# Patient Record
Sex: FEMALE | Race: WHITE | NOT HISPANIC OR LATINO | Employment: FULL TIME | URBAN - METROPOLITAN AREA
[De-identification: names, ages, dates, MRNs, and addresses within clinical notes are randomized per-mention and may not be internally consistent; named-entity substitution may affect disease eponyms.]

---

## 2024-10-22 NOTE — PROGRESS NOTES
"Thuy Martinez is a 34 y.o. here for abnormal menses  HPI: Things are better than when she made this appointment.  She has 6-7 months of really bad periods. Had to take off work. One day she threw up.  The last 3 periods were Ok.  Is it PCOS? Is it endo.    A few reasons she does not want to get on birth control. She took it and felt different for her. Decrease in sex drive. Worries about side effects.  Has a period every month. Last 5-6 days. Varies within 5 days.very heavy. Pain and vomiting on the heavy days.    Once came 1 week after the last one.  Has PMDD.  No plans for children.  had vasectomy.  Has meoprolol for tachycardia and losartan for htn.  No exercise  Saw a dietician last year but gained some weight back  GynHx: dysmenorrhea, PMDD  Pap Hx: This year at her PCP  Social History     Substance and Sexual Activity   Sexual Activity Yes    Partners: Male    Birth control/protection: Male Sterilization     Current contraception: Vasectomy  STIs: none  Past med hx and past surg hx reviewed and notable for: htn, PMDD, depression, obesity    Objective   /78   Ht 1.715 m (5' 7.5\")   Wt 120 kg (265 lb)   LMP 10/18/2024   BMI 40.89 kg/m²   Physical Exam  Vitals reviewed.   Constitutional:       Appearance: Normal appearance. She is obese.   Neurological:      General: No focal deficit present.      Mental Status: She is alert.   Psychiatric:         Mood and Affect: Mood normal.         Behavior: Behavior normal.          Assessment and Plan:  Problem List Items Addressed This Visit          Ob-Gyn Problems    Dysmenorrhea - Primary    Relevant Medications    norethindrone (Aygestin) 5 mg tablet     Other Visit Diagnoses       Class 3 severe obesity without serious comorbidity with body mass index (BMI) of 40.0 to 44.9 in adult, unspecified obesity type        Relevant Orders    Referral to Endocrinology           Orders Placed This Encounter   Procedures    Referral to Endocrinology     Standing " Status:   Future     Standing Expiration Date:   10/24/2025     Referral Priority:   Routine     Referral Type:   Consultation     Referral Reason:   Specialty Services Required     Referred to Provider:   Fer Restrepo MD     Requested Specialty:   Endocrinology     Number of Visits Requested:   1

## 2024-10-24 ENCOUNTER — APPOINTMENT (OUTPATIENT)
Dept: OBSTETRICS AND GYNECOLOGY | Facility: CLINIC | Age: 34
End: 2024-10-24
Payer: COMMERCIAL

## 2024-10-24 VITALS
SYSTOLIC BLOOD PRESSURE: 122 MMHG | HEIGHT: 68 IN | BODY MASS INDEX: 40.16 KG/M2 | WEIGHT: 265 LBS | DIASTOLIC BLOOD PRESSURE: 78 MMHG

## 2024-10-24 DIAGNOSIS — N94.6 DYSMENORRHEA: Primary | ICD-10-CM

## 2024-10-24 DIAGNOSIS — E66.01 CLASS 3 SEVERE OBESITY WITHOUT SERIOUS COMORBIDITY WITH BODY MASS INDEX (BMI) OF 40.0 TO 44.9 IN ADULT, UNSPECIFIED OBESITY TYPE: ICD-10-CM

## 2024-10-24 DIAGNOSIS — E66.813 CLASS 3 SEVERE OBESITY WITHOUT SERIOUS COMORBIDITY WITH BODY MASS INDEX (BMI) OF 40.0 TO 44.9 IN ADULT, UNSPECIFIED OBESITY TYPE: ICD-10-CM

## 2024-10-24 PROCEDURE — 99203 OFFICE O/P NEW LOW 30 MIN: CPT | Performed by: OBSTETRICS & GYNECOLOGY

## 2024-10-24 PROCEDURE — 3078F DIAST BP <80 MM HG: CPT | Performed by: OBSTETRICS & GYNECOLOGY

## 2024-10-24 PROCEDURE — 3008F BODY MASS INDEX DOCD: CPT | Performed by: OBSTETRICS & GYNECOLOGY

## 2024-10-24 PROCEDURE — 3074F SYST BP LT 130 MM HG: CPT | Performed by: OBSTETRICS & GYNECOLOGY

## 2024-10-24 PROCEDURE — 1036F TOBACCO NON-USER: CPT | Performed by: OBSTETRICS & GYNECOLOGY

## 2024-10-24 RX ORDER — LOSARTAN POTASSIUM 50 MG/1
50 TABLET ORAL
COMMUNITY
Start: 2024-07-01

## 2024-10-24 RX ORDER — LOSARTAN POTASSIUM 25 MG/1
50 TABLET ORAL DAILY
COMMUNITY
End: 2024-10-24 | Stop reason: ALTCHOICE

## 2024-10-24 RX ORDER — METOPROLOL SUCCINATE 25 MG/1
1 TABLET, EXTENDED RELEASE ORAL
COMMUNITY
Start: 2024-10-14

## 2024-10-24 RX ORDER — SERTRALINE HYDROCHLORIDE 50 MG/1
1 TABLET, FILM COATED ORAL
COMMUNITY
Start: 2024-10-14

## 2024-10-24 RX ORDER — NORETHINDRONE 5 MG/1
2.5 TABLET ORAL DAILY
Qty: 15 TABLET | Refills: 11 | Status: SHIPPED | OUTPATIENT
Start: 2024-10-24 | End: 2025-10-24

## 2024-10-24 ASSESSMENT — PAIN SCALES - GENERAL: PAINLEVEL_OUTOF10: 0-NO PAIN

## 2024-10-25 PROBLEM — E88.810 INSULIN RESISTANCE SYNDROME: Status: ACTIVE | Noted: 2024-10-25

## 2024-10-25 PROBLEM — I10 PRIMARY HYPERTENSION: Status: ACTIVE | Noted: 2023-02-23

## 2024-10-25 PROBLEM — E66.813 OBESITY, CLASS III, BMI 40-49.9 (MORBID OBESITY): Status: ACTIVE | Noted: 2023-02-23

## 2024-10-25 PROBLEM — N94.6 DYSMENORRHEA: Status: ACTIVE | Noted: 2022-04-01

## 2024-10-25 PROBLEM — F41.9 ANXIETY: Status: ACTIVE | Noted: 2023-02-23

## 2024-10-25 PROBLEM — F32.81 PMDD (PREMENSTRUAL DYSPHORIC DISORDER): Status: ACTIVE | Noted: 2022-04-01

## 2024-10-25 PROBLEM — F33.1 MODERATE EPISODE OF RECURRENT MAJOR DEPRESSIVE DISORDER: Status: ACTIVE | Noted: 2024-04-18

## 2024-10-25 PROBLEM — E66.01 OBESITY, CLASS III, BMI 40-49.9 (MORBID OBESITY) (MULTI): Status: ACTIVE | Noted: 2023-02-23

## 2024-11-05 ENCOUNTER — APPOINTMENT (OUTPATIENT)
Dept: ENDOCRINOLOGY | Facility: CLINIC | Age: 34
End: 2024-11-05
Payer: COMMERCIAL

## 2024-11-05 VITALS — WEIGHT: 263 LBS | HEIGHT: 66 IN | BODY MASS INDEX: 42.27 KG/M2

## 2024-11-05 DIAGNOSIS — E66.01 CLASS 3 SEVERE OBESITY WITHOUT SERIOUS COMORBIDITY WITH BODY MASS INDEX (BMI) OF 40.0 TO 44.9 IN ADULT, UNSPECIFIED OBESITY TYPE: ICD-10-CM

## 2024-11-05 DIAGNOSIS — E66.813 CLASS 3 SEVERE OBESITY WITHOUT SERIOUS COMORBIDITY WITH BODY MASS INDEX (BMI) OF 40.0 TO 44.9 IN ADULT, UNSPECIFIED OBESITY TYPE: ICD-10-CM

## 2024-11-05 PROCEDURE — 1036F TOBACCO NON-USER: CPT | Performed by: NURSE PRACTITIONER

## 2024-11-05 PROCEDURE — 3008F BODY MASS INDEX DOCD: CPT | Performed by: NURSE PRACTITIONER

## 2024-11-05 PROCEDURE — 99204 OFFICE O/P NEW MOD 45 MIN: CPT | Performed by: NURSE PRACTITIONER

## 2024-11-05 RX ORDER — SEMAGLUTIDE 0.25 MG/.5ML
0.25 INJECTION, SOLUTION SUBCUTANEOUS
Qty: 2 ML | Refills: 1 | Status: SHIPPED | OUTPATIENT
Start: 2024-11-05 | End: 2024-11-05 | Stop reason: WASHOUT

## 2024-11-05 ASSESSMENT — ENCOUNTER SYMPTOMS
NAUSEA: 0
DYSPHORIC MOOD: 0
SHORTNESS OF BREATH: 0
WHEEZING: 0
ARTHRALGIAS: 0
POLYDIPSIA: 0
NUMBNESS: 0
FREQUENCY: 0
NERVOUS/ANXIOUS: 0
POLYPHAGIA: 0
CONSTIPATION: 0
PALPITATIONS: 0
FATIGUE: 0

## 2024-11-05 NOTE — PROGRESS NOTES
"This is a virtual visit where physical exam is limited and ROS and hx is obtained.    Thuy Martinez presents for weight loss management and obesity consult today. Referred by Dr. Dang her Gyn.  She has heavy menses thought to be related to endometriosis treated with OCP.    Her menses are monthly.  No heavy menses  She does chin hairs    Her PMH does note insulin resistance, HTN,  and obesity class 3    Obesity History:  Childhood or teen obesity history: yes  Age of Onset:  \"when I was a child, in my first weight Weight Watcher as a child\" \"I got my first period at 11 and breasts and hips and looked like a woman as a child\" \" her mom and sister were thing she says, this and being in WW at such a young age created body shame  \"in looking back I think it is really messed up that she put more emphasis on weight loss than loving my body despite losing the weight'  Lowest adult weight:   Highest adult weight:   Current weight: 263     Family history of obesity: no    Biggest challenge with weight management:     History of Weight Loss Efforts: yes  Successful weight loss techniques attempted: nutritionist consultation and Weight Watchers  Unsuccessful weight loss techniques attempted:  nothing mentioned    Current typical daily diet:  Overall we eat pretty well, eating well website or skinny taste recipes pretty much all the time maybe a pizza every two weeks\"  \"Meal prepping and portion sizes we do a lot\"  Typical Breakfast: protein shake  Typical Lunch: salad  Typical Dinner: meal prep  Soda/latte weekly intake: no, fresca at tonight, crystal light energy packets  Take out/carry out/fast food weekly: 1 x week  Portion sizes/seconds with meals: small to medium    Snacking  Daytime snacks: no  Evening snacks: sometimes, here and there 100 calorie popsicles      Describe Appetite (always hungry/no hunger/average):  Are you full after a meal?  no, \"when I was on WW I never really felt full\"  Emotional eater? Yes   Boredom " "eater? Yes     Current Exercise Habits  none, \"this is a real issue with me I do not like the feeling of being hot and sweaty and I hate that feeling and know I need to exercise but I don't because I hate the feeling\"    Sleep patterns (insomnia, waking in night) /hours of sleep per night: 11-7 AM  H/O Sleep apnea: yes  Well rested? Yes     Increased Stressors (describe daily stress): no      Any intake of an appetite suppressant or an anti-obesity medicine? No     H/O Pancreatitis: no  H/O Thyroid Medullary Cancer: no    No past medical history on file.    Current Outpatient Medications   Medication Sig Dispense Refill    losartan (Cozaar) 50 mg tablet Take 1 tablet (50 mg) by mouth once daily.      metoprolol succinate XL (Toprol-XL) 25 mg 24 hr tablet Take 1 tablet (25 mg) by mouth early in the morning..      norethindrone (Aygestin) 5 mg tablet Take 0.5 tablets (2.5 mg) by mouth once daily. 15 tablet 11    sertraline (Zoloft) 50 mg tablet Take 1 tablet (50 mg) by mouth early in the morning..       No current facility-administered medications for this visit.     frank abnormal data LIPID PANEL BASIC  Order: [id number]  Component  Ref Range & Units 6 mo ago   Cholesterol, Total  <200 mg/dL 179   Comment: <200 mg/dL, Desirable   200-239 mg/dL, Borderline high  >239 mg/dL, High   Triglyceride  <150 mg/dL 135   Comment: <150 mg/dL, Normal   150-199 mg/dL, Borderline high   200-499 mg/dL, High  >499 mg/dL, Very high   HDL Cholesterol  >39 mg/dL 39 Low    Comment:  40-59 mg/dL, Acceptable  >59 mg/dL, High: Negative risk factor for coronary heart disease  <40 mg/dL, Low: Positive risk factor for coronary heart disease   Non HDL Cholesterol  <130 mg/dL 140 High    Comment: <130 mg/dL, Optimal   130-159 mg/dL, Near optimal/above optimal   160-189 mg/dL, Borderline high   190-219 mg/dL, High  >219 mg/dL, Very high  Secondary prevention optimal non HDL Cholesterol levels are recommended to be <100 mg/dL   Fasting Time  hrs " "12   VLDL Cholesterol  <30 mg/dL 27   TC:HDL Ratio  <5.10 4.59   LDL Cholesterol  <100 mg/dL 113 High    Comment: <100 mg/dL, Optimal   100-129 mg/dL, Near optimal/above optimal   130-159 mg/dL, Borderline high   160-189 mg/dL, High  >189 mg/dL, Very high  Secondary prevention optimal LDL Cholesterol levels are recommended to be < 70 mg/dL   LDL:HDL Ratio  <2.54 2.90 High    Comment: Reference:  1. National Cholesterol Education Program ATP III Guideline At-A-Glance Quick Desk Reference: National Heart, Lung, and Blood [place]. National Institutes of Health. 2001: NIH Publication No. .  2. An International Atherosclerosis Society position paper: global recommendations for the management of dyslipidemia: executive summary, Atherosclerosis. 2014: 232(2):410-413.         Review of Systems  Review of Systems   Constitutional:  Negative for fatigue.   Respiratory:  Negative for shortness of breath and wheezing.    Cardiovascular:  Negative for chest pain and palpitations.   Gastrointestinal:  Negative for constipation and nausea.   Endocrine: Negative for polydipsia, polyphagia and polyuria.   Genitourinary:  Negative for frequency and urgency.   Musculoskeletal:  Negative for arthralgias.   Skin:  Negative for rash.   Neurological:  Negative for numbness.   Psychiatric/Behavioral:  Negative for dysphoric mood. The patient is not nervous/anxious.            Objective   LMP 10/18/2024     Physical Exam  Physical Exam  Neurological:      Mental Status: She is alert and oriented to person, place, and time.   Psychiatric:         Mood and Affect: Mood normal.         Behavior: Behavior normal.         Thought Content: Thought content normal.         Judgment: Judgment normal.         Lab Review  No results found for: \"HGBA1C\"  No results found for: \"LDLCALC\"    Weight Trends  Trends of weight reviewed in visit, see graph below:  Wt Readings from Last 3 Encounters:   10/24/24 120 kg (265 lb)   11/02/22 119 kg (262 " lb)   10/24/22 119 kg (262 lb 7 oz)   (  Assessment/Plan     Follow up and Goals:  Nutrition: Consult with Leanna. My Net Diary for logging a couple week before the visit. Continue with meal prep and protein shake  Physical Exercise: Continue to look into the rowing matching and a floor fan to place near it to address overheating with the workouts. Resistance and strength exercises 3 times per week are beneficial to build muscle, bone health, increase resting metabolic weight, decreasing muscle wasting, balance,   Medications: Call your insurance to see if they cover obesity medicines (Wegovy, Zepboud, Contrave, Qsymia, phentermine). See below on medication information for information only if medications desired in future      Problem List Items Addressed This Visit    None  Visit Diagnoses       Class 3 severe obesity without serious comorbidity with body mass index (BMI) of 40.0 to 44.9 in adult, unspecified obesity type                Diet interventions: referral to dietitian for guidance in these changes  Discussed Mediterranean Diet, given pamphlet or it will be mailed, we looked at ADA plate, portion sizes, recipes. Advised to review in preparation for nutrition consult    Handouts given:  no     Exercise intervention:   We discussed the importance of incorporating resistance and free weight  lifting into physical activity routine to prevent muscle wasting with weight loss, enhance bone health, the positive role increased muscle has in burning fat at rest. We looked at examples of these types of exercise routines online. Advised to do modifications. Advised to check with PCP if there is a h/o musculoskeletal injury or hx. We discussed benefits of walking with weight loss and as a cardio form of exercise. Gradually increase exercise to a goal of 150 minutes at least per week.      Follow Up:  Referred to nutrition or continue to work with current dietitian   Discussed obesity medications, side effects and  mechanism of action reviewed with patient  Discussed physical activity: we reviewed Youtube videos for strength training, advised to use modifications for these videos, we discussed benefits of strength training and resistance bands  on bone and muscle health, we discussed walking and water aerobic options for cardio  Discussed Mediterranean Diet, given pamphlet or it will be mailed, we looked at ADA plate, portion sizes, recipes. Advised to review Mediterranean Meal Plan booklet  in preparation for nutrition consult  ....  1 month group visit and nutrition visit in person or  virtual  Please reach out if you need anything or have further questions

## 2024-11-05 NOTE — PATIENT INSTRUCTIONS
Nutrition: Consult with Leanna. My Net Diary for logging a couple week before the visit. Continue with meal prep and protein shake  Physical Exercise: Continue to look into the rowing matching and a floor fan to place near it to address overheating with the workouts. Resistance and strength exercises 3 times per week are beneficial to build muscle, bone health, increase resting metabolic weight, decreasing muscle wasting, balance,   Medications: Call your insurance to see if they cover obesity medicines (Wegovy, Zepboud, Contrave, Qsymia, phentermine). See below on medication information for information only if medications desired in future        [facility]: Obesity Program  Today we discussed some of the following medications.  If not prescribed already, please look into these medications on your own, and please call your insurance for coverage questions.  If you would like to contact our office with additional questions or a request for a prescription, write us via a [employer] message, or call [phone].    The following summarizes the medications currently available for weight management:    q    Phentermine (aka Adipex):   This medication is a stimulant and can suppress appetite. Common side effects include an increase in blood pressure (BP) and heart rate (HR), and excessive thirst. You will need to be able to monitor both BP and HR while on this medication. If you have any cardiac issues you may need to contact your cardiologist/PCP to get authorization to take the medication. This medication is a controlled substance in the State of [place]. Per the law, you will need to sign a stimulant consent form when taking Phentermine.  Additionally, you will need to be seen in the office (in person) every 3 months when on this medication.  Please schedule appointments in advance to ensure that we comply with the [place] Law. Phentermine is usually the most cost effective of the appetite suppressants  (usually no more than $40/month and can sometimes use GoodRX).  q Qsymia:   This is a combination of two medications: Phentermine and Topamax (aka Topiramate). Topamax is often given for migraine headaches and additionally contributes to appetite suppression.  Since phentermine is part of Qsymia, this medication combination is also considered a controlled substance.  A stimulant consent will need to be signed upon initiation of this medication. Similar to phentermine alone, Qsymia also requires an in person office visit every 3 months.  Schedule visits in advance to comply with the [place] Law.  The cost of the medication depends on the patient's individual insurance but if too costly, the medication can be sent to an online mail order pharmacy (directions below) for approximately $100/month. [email or web address]  you will need to go to this website, register in the top right hand corner. The online pharmacy will contact you for billing info and our office can electronically send the script to them.   q Contrave:  This medication is a combination of Wellbutrin (aka Buproprion) and Naltrexone. The medication combination helps to decrease appetite and sometimes cravings as well.   Contrave can cause opioid withdrawal.  Patients using chronic opioid therapy should not sure this medication.  Those who use temporary opioid therapy should hold contrave until 14 days after their last opioid dose.  When taking Contrave, a titration process is needed over the first month.   Titration:  For the first week you will take one tablet in the AM,  For the 2nd week you will take one tablet in the AM and one tablet in the PM,   For the 3rd week you will take 2 tablets in the AM and one tablet in the PM, and   For the fourth week (and onward) you will take 2 tablets in the AM and 2 tablets in the PM.     The cost of this medication depends on the individual's insurance. There are 2 options if the medication is not  affordable at the retail pharmacy:  It can be sent to a mail order pharmacy that approximates to ~ $100/month.    Mail order pharmacy- [email or web address] You will need to go to this website to register and our office will send an electronic script.  Our office can send each individual medication (Wellbutrin, Naltrexone) to your local   pharmacy.  If sending the two individual medications to your local pharmacy, the prescriptions will appear as: Wellbutrin 150mg tablet taken daily and Naltrexone 50mg- start taking half tablet for one week and then increase to one tablet daily.   q Wegovy:   This medication is a weekly injection (and actually contains the same ingredient as in Ozempic). This medication is FDA approved for weight loss.  Common side effects include nausea, vomiting, diarrhea or constipation and abdominal pain.  Often these GI side effects resolve with time.  Some patients have shared that injecting the medicine to the thigh area instead of the stomach area helps with the GI side effects.  Do not take this medication if you have a history of pancreatitis.  Additionally, do not take this medication if you or any family members have been diagnosed with Medullary Thyroid Cancer or Multiple Endocrine Neoplasia.  If insurance coverage is poor, retail price for this medication is approximately ~$1200/month.  q Zepbound:   This medication is a weekly injection (and actually contains the same ingredient as in Mounjaro). This medication is FDA approved for weight loss.  Common side effects include nausea, vomiting, diarrhea or constipation and abdominal pain.  Often these GI side effects resolve with time.  Some patients have shared that injecting the medicine to the thigh area instead of the stomach area helps with the GI side effects.  Do not take this medication if you have a history of pancreatitis.  Additionally do not take this medication if you or any family members have been diagnosed with  Medullary Thyroid Cancer or Multiple Endocrine Neoplasia.  If insurance coverage is poor, retail price for this medication is approximately ~$1200/month.  q Saxenda:   This medication is a daily injection.  This medication is FDA approved for weight loss.  Common side effects include nausea, vomiting, diarrhea or constipation and abdominal pain.  Often these GI side effects resolve with time.  Some patients have shared that injecting the medicine to the thigh area instead of the stomach area helps with the GI side effects.  Do not take this medication if you have a history of pancreatitis.  Additionally, do not take this medication if you or any family members have been diagnosed with Medullary Thyroid Cancer or Multiple Endocrine Neoplasia.  If insurance coverage is poor, retail price for this medication is approximately ~$1200/month.        q Ozempic:  This is a diabetes medication and is administered as a weekly injection. It is often NOT covered by insurance unless you are officially diagnosed with type 2 diabetes.  Common side effects include nausea, vomiting, diarrhea or constipation and abdominal pain.  Often these GI side effects resolve with time.  Some patients have shared that injecting the medicine to the thigh area instead of the stomach area helps with the GI side effects.  Do not take this medication if you have a history of pancreatitis.  Additionally, do not take this medication if you or any family members have been diagnosed with Medullary Thyroid Cancer or Multiple Endocrine Neoplasia.  If insurance coverage is poor, retail price for this medication is approximately ~$1200/month.  q Mounjaro:   This is a diabetes medication and is administered as a weekly injection. It is often NOT covered by insurance unless you are officially diagnosed with type 2 diabetes.  Common side effects include nausea, vomiting, diarrhea or constipation and abdominal pain.  Often these GI side effects resolve with time.   Some patients have shared that injecting the medicine to the thigh area instead of the stomach area helps with the GI side effects.  Do not take this medication if you have a history of pancreatitis.  Additionally, do not take this medication if you or any family members have been diagnosed with Medullary Thyroid Cancer or Multiple Endocrine Neoplasia.  If insurance coverage is poor, retail price for this medication is approximately ~$1200/month.      Please always review the official side effect profile for the above medications with the Pharmacist if further questions arise.

## 2024-11-12 ENCOUNTER — APPOINTMENT (OUTPATIENT)
Dept: ENDOCRINOLOGY | Facility: CLINIC | Age: 34
End: 2024-11-12
Payer: COMMERCIAL

## 2024-11-12 VITALS — WEIGHT: 261 LBS | HEIGHT: 66 IN | BODY MASS INDEX: 41.95 KG/M2

## 2024-11-12 DIAGNOSIS — E66.813 CLASS 3 SEVERE OBESITY WITHOUT SERIOUS COMORBIDITY WITH BODY MASS INDEX (BMI) OF 40.0 TO 44.9 IN ADULT, UNSPECIFIED OBESITY TYPE: ICD-10-CM

## 2024-11-12 DIAGNOSIS — Z71.3 DIETARY COUNSELING: Primary | ICD-10-CM

## 2024-11-12 DIAGNOSIS — E66.01 CLASS 3 SEVERE OBESITY WITHOUT SERIOUS COMORBIDITY WITH BODY MASS INDEX (BMI) OF 40.0 TO 44.9 IN ADULT, UNSPECIFIED OBESITY TYPE: ICD-10-CM

## 2024-11-12 PROCEDURE — 97802 MEDICAL NUTRITION INDIV IN: CPT | Performed by: DIETITIAN, REGISTERED

## 2024-11-12 NOTE — PATIENT INSTRUCTIONS
- Please refer to your book entitled: Your Mediterranean Meal Plan, and follow Mediterranean Diet eating guidelines as reviewed.  - The Healthy Plate style of eating can be a helpful tool for incorporating healthy balanced meals in appropriate portions. (Healthy Plate: Start with a 9-inch diameter plate. Fill 1/2 the plate with non-starchy vegetables, 1/4 of the plate with whole grains or starchy vegetables, and 1/4 of the plate with a lean source of protein.   - Please aim for a source of healthy protein and fiber rich foods at meals as discussed for nutrition needs as well as to help provide better satiety at meals.   - Consider pre-planning healthy meals for the week. Refer to your book for both menu and recipe ideas to get you started.  - Further recipes can also be found at: [email or web address]  - Maintain tracking daily food intake for accountability with food choices and portions and aim for 1800 calories daily.  - Keep up the great work with your daily water intakes.  - Aim for 150 minutes of moderate-intensity physical activity per week. Resistance training is encouraged at least twice weekly.  - Follow-up as scheduled for the group classes in December as scheduled.

## 2024-11-12 NOTE — PROGRESS NOTES
"Initial Nutrition Assessment    Patient was referred to nutrition by GEORGE Denise  for weight management/desire to lose weight. Other PMHX significant for insulin resistance and HTN.      Diet recall reveals a consistent meal pattern with rare skipped meals, as well as recent implementation of well balanced meals with attention to consistent lean protein, fruits/vegetables/complex CHO, and healthy fat food sources in appropriate portions to assist in achieving goals. Pt with a successful 2 lbs weight loss over the past week and if all behaviors maintained, further weight loss likely to be achieved. Fluids meeting recommendations in type and amount with water as primary beverage. Pt is incorporating some weekly physical activity, meeting low-end of weekly recommendations. See all interventions/recommendations below as discussed during visit this day.     Patient reported symptoms: Difficulty losing weight    Anthropometrics:  Height:   Ht Readings from Last 1 Encounters:   11/05/24 1.676 m (5' 6\")      Weight:   Wt Readings from Last 10 Encounters:   11/05/24 119 kg (263 lb)   10/24/24 120 kg (265 lb)   11/02/22 119 kg (262 lb)   10/24/22 119 kg (262 lb 7 oz)   08/30/22 123 kg (271 lb)   03/30/22 121 kg (266 lb 5 oz)      Current BMI:   BMI Readings from Last 1 Encounters:   11/05/24 42.45 kg/m²        Labs:  No results found for: \"HGBA1C\"   Lab Results   Component Value Date    CHOL 162 03/30/2022     Lab Results   Component Value Date    HDL 40.0 03/30/2022     No results found for: \"LDLCALC\"  Lab Results   Component Value Date    TRIG 138 03/30/2022       Malnutrition Screening:  Significant Unintentional weight loss: No  Eating less than 75% of usual intake for more than 2 weeks: No  Potential Signs of Inflammation: No    Recommended Malnutrition Diagnosis: No malnutrition identified    Diet Recall-  Breakfast- 1/2 Protein shake mixed with almond milk and an English muffin with cheese and a ground " turkey piotr and egg (made into a sandwich)   Lunch- salad (chicken, quinoa, salad green, avocado, cheese) OR soup (chicken noodle soup)  Dinner- Pasta dishes with vegetables and sausage, ground turkey meatballs with a starch and vegetable and eats out on average once weekly  Daily Snacks- popsicle OR pretzels OR fruit OR a small cookie on occasion  Beverages- coffee in am with a SF flavoring and a small chocolate drizzle, water throughout the day and sometimes flavored with Crystal Light (100 ounces most days)  Alcohol- has a drink 1-2 times weekly   Physical Activity- light weights occasionally which she just started and is planning to start to use the stepper    Other pertinent patient reported Information:  - Past weight loss attempts include: RD visits and Weight Watchers  - Just started to track daily food intakes and averaging 1800 calories daily with a 2 lbs weight loss over the past week as a result  - Also doing increased meal prep for the week with using healthy recipes off of various websites  - Further reports recently starting to incorporate weekly activity and planning to increase overtime.    Nutrition Diagnosis: Food and nutrition-related knowledge deficit related to lack of recent exposure to information as evidenced by BMI above normative standard for age and gender.    Readiness to Learn:  Cognitive ability: Alert and oriented  Motivation to learn: Interested  Family Support: Unable to assess- family not present  Instruction provided to: Patient  Patient learns best by: Multiple methods  Factors affecting learning: None   Physical limitations affecting learning: None    Education Materials Provided:   Your Mediterranean Meal Plan Booklet    Nutrition Interventions/Recommendations for 11/12/2024:  - Please refer to your book entitled: Your Mediterranean Meal Plan, and follow Mediterranean Diet eating guidelines as reviewed.  - The Healthy Plate style of eating can be a helpful tool for  incorporating healthy balanced meals in appropriate portions. (Healthy Plate: Start with a 9-inch diameter plate. Fill 1/2 the plate with non-starchy vegetables, 1/4 of the plate with whole grains or starchy vegetables, and 1/4 of the plate with a lean source of protein.   - Please aim for a source of healthy protein and fiber rich foods at meals as discussed for nutrition needs as well as to help provide better satiety at meals.   - Consider pre-planning healthy meals for the week. Refer to your book for both menu and recipe ideas to get you started.  - Further recipes can also be found at: [email or web address]  - Maintain tracking daily food intake for accountability with food choices and portions and aim for 1800 calories daily.  - Keep up the great work with your daily water intakes.  - Aim for 150 minutes of moderate-intensity physical activity per week. Resistance training is encouraged at least twice weekly.  - Follow-up as scheduled for the group classes in December as scheduled.      Nutrition Monitoring & Evaluation: adherence to recommendations and patient stated goals    Need for follow-up:  [facility] in December as scheduled    Referred by: ENRRIQUE Denise-CNP     MNT Billing Type: Medical Nutrition Assessment, each 15 min increment, for 3 increments.    SIGNATURE:   Leanna Pretty RD, BRENDAN, LD, CDCES                                                        DATE:   11/12/2024

## 2024-12-09 ENCOUNTER — APPOINTMENT (OUTPATIENT)
Dept: ENDOCRINOLOGY | Facility: CLINIC | Age: 34
End: 2024-12-09
Payer: COMMERCIAL

## 2024-12-09 VITALS — WEIGHT: 263 LBS | BODY MASS INDEX: 42.27 KG/M2 | HEIGHT: 66 IN

## 2024-12-09 DIAGNOSIS — E66.01 CLASS 3 SEVERE OBESITY WITHOUT SERIOUS COMORBIDITY WITH BODY MASS INDEX (BMI) OF 40.0 TO 44.9 IN ADULT, UNSPECIFIED OBESITY TYPE: Primary | ICD-10-CM

## 2024-12-09 DIAGNOSIS — E66.813 CLASS 3 SEVERE OBESITY WITHOUT SERIOUS COMORBIDITY WITH BODY MASS INDEX (BMI) OF 40.0 TO 44.9 IN ADULT, UNSPECIFIED OBESITY TYPE: Primary | ICD-10-CM

## 2024-12-09 PROCEDURE — 3008F BODY MASS INDEX DOCD: CPT | Performed by: NURSE PRACTITIONER

## 2024-12-09 PROCEDURE — 1036F TOBACCO NON-USER: CPT | Performed by: NURSE PRACTITIONER

## 2024-12-09 PROCEDURE — 99215 OFFICE O/P EST HI 40 MIN: CPT | Performed by: NURSE PRACTITIONER

## 2024-12-09 ASSESSMENT — PATIENT HEALTH QUESTIONNAIRE - PHQ9
2. FEELING DOWN, DEPRESSED OR HOPELESS: NOT AT ALL
SUM OF ALL RESPONSES TO PHQ9 QUESTIONS 1 AND 2: 0
1. LITTLE INTEREST OR PLEASURE IN DOING THINGS: NOT AT ALL

## 2024-12-09 NOTE — PATIENT INSTRUCTIONS
New Goals:   Nutrition- get back to tracking     Exercise- find an exercise where she has consistency doing it for 3 times per week     Follow up: 1 month

## 2024-12-09 NOTE — PROGRESS NOTES
"Subjective  Thuy Martinez is a 34 y.o. female with a hx of obesity  who presents for weight management and obesity medicine follow up.    Update: \"I loss 4 pounds then gain  Current Plan  1. Nutrition: Mediterranean Diet, tracking   \"when I track I am on  track but when not I fall off\"  \"I struggle with vacations and getting out  Meal prepping    2. Sleep: Stable      3. Stress: Stable     4. Exercise: Incorporating inconsistently-      Lenore discouraged in this area, had a goal for row machine and talked out of it by her mom and sister who said it was expensive and she had never done rowing therefore not a good investment, she feels guilty for not following through with her plan    Found a YouTube channel she desires to begin to use tonight    5. Appetite control: Stable    Obesity medication: N/A     6. Prior Goals: Partially Met   Nutrition: Consult with Leanna. My Net Diary for logging a couple week before the visit. Continue with meal prep and protein shake- MET, consult on 11/5/24  Physical Exercise: Continue to look into the rowing matching and a floor fan to place near it to address overheating with the workouts. Resistance and strength exercises 3 times per week are beneficial to build muscle, bone health, increase resting metabolic weight, decreasing muscle wasting, balance, - Not Yet  Medications: Call your insurance to see if they cover obesity medicines (Wegovy, Zepboud, Contrave, Qsymia, phentermine). See below on medication information for information only if medications desired in future  New Goals: Nutrition- get back to tracking and Exercise- find an exercise where she has consistency doing it for 3 times per week     Weight trend:    Wt Readings from Last 3 Encounters:   12/09/24 119 kg (263 lb)   11/12/24 118 kg (261 lb)   11/05/24 119 kg (263 lb)       Recent weight:      Current Outpatient Medications:     losartan (Cozaar) 50 mg tablet, Take 1 tablet (50 mg) by mouth once daily., Disp: , Rfl: "     metoprolol succinate XL (Toprol-XL) 25 mg 24 hr tablet, Take 1 tablet (25 mg) by mouth early in the morning.., Disp: , Rfl:     norethindrone (Aygestin) 5 mg tablet, Take 0.5 tablets (2.5 mg) by mouth once daily., Disp: 15 tablet, Rfl: 11    sertraline (Zoloft) 50 mg tablet, Take 1 tablet (50 mg) by mouth early in the morning.., Disp: , Rfl:     ROS:  System: normal  Eyes : no visual changes  Neck : no tenderness, no new lumps/bumps  Respiratory : no SOB  Cardiovascular : no chest pain, no palpitations  Gastro-Intestinal : no abdominal concerns  Neurological : no numbness or tingling in the extremities  Musculoskeletal : no joint paint, no muscle pain  Skin : no unusual rashes  Psychiatric : no depression, no anxiety  See HPI for Endocrine ROS    No past medical history on file.    Past Surgical History:   Procedure Laterality Date    OTHER SURGICAL HISTORY  03/30/2022    No history of surgery       Social History     Socioeconomic History    Marital status:      Spouse name: Not on file    Number of children: Not on file    Years of education: Not on file    Highest education level: Not on file   Occupational History    Occupation:    Tobacco Use    Smoking status: Never    Smokeless tobacco: Never   Substance and Sexual Activity    Alcohol use: Yes     Comment: binge drinking this summer, but better, not every week    Drug use: Never    Sexual activity: Yes     Partners: Male     Birth control/protection: Male Sterilization   Other Topics Concern    Not on file   Social History Narrative    Not on file     Social Drivers of Health     Financial Resource Strain: Medium Risk (4/17/2024)    Received from [facility]    Overall Financial Resource Strain (CARDIA)     Difficulty of Paying Living Expenses: Somewhat hard   Food Insecurity: No Food Insecurity (4/17/2024)    Received from [facility]    Hunger Vital Sign     Worried About Running Out of Food in the Last Year: Never true  "    Ran Out of Food in the Last Year: Never true   Transportation Needs: No Transportation Needs (4/17/2024)    Received from [facility]    PRAPARE - Transportation     Lack of Transportation (Medical): No     Lack of Transportation (Non-Medical): No   Physical Activity: Insufficiently Active (4/17/2024)    Received from [facility]    Exercise Vital Sign     Days of Exercise per Week: 2 days     Minutes of Exercise per Session: 30 min   Stress: Stress Concern Present (4/17/2024)    Received from [facility]    [employer] [place] [employer] - Occupational Stress Questionnaire     Feeling of Stress : Rather much   Social Connections: Moderately Integrated (4/17/2024)    Received from [facility]    Social Connection and Isolation Panel [NHANES]     Frequency of Communication with Friends and Family: Three times a week     Frequency of Social Gatherings with Friends and Family: Once a week     Attends [employer] Services: Never     Active Member of Clubs or Organizations: Yes     Attends Club or Organization Meetings: More than 4 times per year     Marital Status:    Intimate Partner Violence: Not on file   Housing Stability: Low Risk  (4/17/2024)    Received from [facility]    Housing Stability Vital Sign     Unable to Pay for Housing in the Last Year: No     Number of Places Lived in the Last Year: 1     Unstable Housing in the Last Year: No       Objective      Physical Exam:  Height 1.676 m (5' 6\"), weight 119 kg (263 lb).  General : alert and oriented X3, no acute distress  Eyes : EOMI     Assessment/Plan   Thuy Martinez is a 34 y.o. female with a hx of obesity who presents for follow up for weight management and obesity medicine visit.    A/P Follow up:      Problem List Items Addressed This Visit    None  Visit Diagnoses       Class 3 severe obesity without serious comorbidity with body mass index (BMI) of 40.0 to 44.9 in adult, unspecified obesity type    -  Primary "              New Goals:   New Goals:   Nutrition- get back to tracking     Exercise- find an exercise where she has consistency doing it for 3 times per week     Follow up: 1 month  [facility] Topic:   Impact of Sleep on on weight Management    Dietitian Present during [facility]: Leanna Pretty RD, CSOWM, LD, CDCES    Weight Management : Reviewed the principles of energy metabolism, caloric intake and expenditure, and rationale for a treatment program.  Also reinforced need for reduced calorie, low fat diet and increased physical activity.    Follow up in a group or individual visit as determined.    Edna Feliciano, APRN-CNP

## 2025-01-02 ENCOUNTER — APPOINTMENT (OUTPATIENT)
Dept: OBSTETRICS AND GYNECOLOGY | Facility: CLINIC | Age: 35
End: 2025-01-02
Payer: COMMERCIAL

## 2025-01-21 NOTE — PROGRESS NOTES
"Subjective   Patient ID: Thuy Martinez is a 34 y.o. female who presents for Follow-up (FUV to meds///Chaperone Declined: FAROOQ Smith/).  Painful menses is better on norethindrone.  But better than on her period.  Bled for 6 weeks.  She felt a fullness like she is getting her period, and got some spotting.    Her sex drive is gone. Has found ways to still be intimate.           Review of Systems    Objective   Physical Exam  Vitals reviewed.   Constitutional:       Appearance: Normal appearance. She is obese.   Neurological:      General: No focal deficit present.      Mental Status: She is alert.   Psychiatric:         Mood and Affect: Mood normal.         Behavior: Behavior normal.         Assessment/Plan   Problem List Items Addressed This Visit             ICD-10-CM       Ob-Gyn Problems    Dysmenorrhea N94.6     Other Visit Diagnoses         Codes    Penicillin allergy    -  Primary Z88.0    Relevant Orders    Referral to Allergy          Some side effects on norethindrone, (decreased libido, spotting), but better than previous situation.  Will continue for now. Also discussed progestin IUD, hysterectomy.    Referral for possible PCN allergy (mom says \"no\" now).         Sally Evangelista MD 01/22/25 10:07 AM   "

## 2025-01-22 ENCOUNTER — APPOINTMENT (OUTPATIENT)
Dept: OBSTETRICS AND GYNECOLOGY | Facility: CLINIC | Age: 35
End: 2025-01-22
Payer: COMMERCIAL

## 2025-01-22 VITALS
WEIGHT: 264 LBS | HEIGHT: 66 IN | DIASTOLIC BLOOD PRESSURE: 70 MMHG | BODY MASS INDEX: 42.43 KG/M2 | SYSTOLIC BLOOD PRESSURE: 118 MMHG

## 2025-01-22 DIAGNOSIS — N94.6 DYSMENORRHEA: ICD-10-CM

## 2025-01-22 DIAGNOSIS — Z88.0 PENICILLIN ALLERGY: Primary | ICD-10-CM

## 2025-01-22 PROCEDURE — 3008F BODY MASS INDEX DOCD: CPT | Performed by: OBSTETRICS & GYNECOLOGY

## 2025-01-22 PROCEDURE — 99212 OFFICE O/P EST SF 10 MIN: CPT | Performed by: OBSTETRICS & GYNECOLOGY

## 2025-01-22 PROCEDURE — 3074F SYST BP LT 130 MM HG: CPT | Performed by: OBSTETRICS & GYNECOLOGY

## 2025-01-22 PROCEDURE — 3078F DIAST BP <80 MM HG: CPT | Performed by: OBSTETRICS & GYNECOLOGY

## 2025-01-22 PROCEDURE — 1036F TOBACCO NON-USER: CPT | Performed by: OBSTETRICS & GYNECOLOGY

## 2025-01-22 ASSESSMENT — PAIN SCALES - GENERAL: PAINLEVEL_OUTOF10: 0-NO PAIN

## 2025-08-21 DIAGNOSIS — N94.6 DYSMENORRHEA: ICD-10-CM

## 2025-08-21 RX ORDER — NORETHINDRONE 5 MG/1
2.5 TABLET ORAL DAILY
Qty: 45 TABLET | Refills: 1 | Status: SHIPPED | OUTPATIENT
Start: 2025-08-21